# Patient Record
Sex: FEMALE | Race: WHITE | NOT HISPANIC OR LATINO | Employment: FULL TIME | URBAN - METROPOLITAN AREA
[De-identification: names, ages, dates, MRNs, and addresses within clinical notes are randomized per-mention and may not be internally consistent; named-entity substitution may affect disease eponyms.]

---

## 2023-02-24 LAB
ALANINE AMINOTRANSFERASE (SGPT) (U/L) IN SER/PLAS: 17 U/L (ref 7–45)
ALBUMIN (G/DL) IN SER/PLAS: 4.3 G/DL (ref 3.4–5)
ALKALINE PHOSPHATASE (U/L) IN SER/PLAS: 43 U/L (ref 33–110)
ANION GAP IN SER/PLAS: 9 MMOL/L (ref 10–20)
ASPARTATE AMINOTRANSFERASE (SGOT) (U/L) IN SER/PLAS: 18 U/L (ref 9–39)
BASOPHILS (10*3/UL) IN BLOOD BY AUTOMATED COUNT: 0.08 X10E9/L (ref 0–0.1)
BASOPHILS/100 LEUKOCYTES IN BLOOD BY AUTOMATED COUNT: 1.6 % (ref 0–2)
BILIRUBIN TOTAL (MG/DL) IN SER/PLAS: 0.6 MG/DL (ref 0–1.2)
CALCIUM (MG/DL) IN SER/PLAS: 10 MG/DL (ref 8.6–10.3)
CARBON DIOXIDE, TOTAL (MMOL/L) IN SER/PLAS: 28 MMOL/L (ref 21–32)
CHLORIDE (MMOL/L) IN SER/PLAS: 103 MMOL/L (ref 98–107)
CREATININE (MG/DL) IN SER/PLAS: 0.86 MG/DL (ref 0.5–1.05)
EOSINOPHILS (10*3/UL) IN BLOOD BY AUTOMATED COUNT: 0.2 X10E9/L (ref 0–0.7)
EOSINOPHILS/100 LEUKOCYTES IN BLOOD BY AUTOMATED COUNT: 4 % (ref 0–6)
ERYTHROCYTE DISTRIBUTION WIDTH (RATIO) BY AUTOMATED COUNT: 12.9 % (ref 11.5–14.5)
ERYTHROCYTE MEAN CORPUSCULAR HEMOGLOBIN CONCENTRATION (G/DL) BY AUTOMATED: 33.5 G/DL (ref 32–36)
ERYTHROCYTE MEAN CORPUSCULAR VOLUME (FL) BY AUTOMATED COUNT: 97 FL (ref 80–100)
ERYTHROCYTES (10*6/UL) IN BLOOD BY AUTOMATED COUNT: 4.23 X10E12/L (ref 4–5.2)
GFR FEMALE: 88 ML/MIN/1.73M2
GLUCOSE (MG/DL) IN SER/PLAS: 90 MG/DL (ref 74–99)
HEMATOCRIT (%) IN BLOOD BY AUTOMATED COUNT: 41.2 % (ref 36–46)
HEMOGLOBIN (G/DL) IN BLOOD: 13.8 G/DL (ref 12–16)
IMMATURE GRANULOCYTES/100 LEUKOCYTES IN BLOOD BY AUTOMATED COUNT: 0.2 % (ref 0–0.9)
LEUKOCYTES (10*3/UL) IN BLOOD BY AUTOMATED COUNT: 5 X10E9/L (ref 4.4–11.3)
LYMPHOCYTES (10*3/UL) IN BLOOD BY AUTOMATED COUNT: 1.57 X10E9/L (ref 1.2–4.8)
LYMPHOCYTES/100 LEUKOCYTES IN BLOOD BY AUTOMATED COUNT: 31.5 % (ref 13–44)
MONOCYTES (10*3/UL) IN BLOOD BY AUTOMATED COUNT: 0.38 X10E9/L (ref 0.1–1)
MONOCYTES/100 LEUKOCYTES IN BLOOD BY AUTOMATED COUNT: 7.6 % (ref 2–10)
NEUTROPHILS (10*3/UL) IN BLOOD BY AUTOMATED COUNT: 2.75 X10E9/L (ref 1.2–7.7)
NEUTROPHILS/100 LEUKOCYTES IN BLOOD BY AUTOMATED COUNT: 55.1 % (ref 40–80)
PLATELETS (10*3/UL) IN BLOOD AUTOMATED COUNT: 273 X10E9/L (ref 150–450)
POTASSIUM (MMOL/L) IN SER/PLAS: 4.4 MMOL/L (ref 3.5–5.3)
PROTEIN TOTAL: 7.6 G/DL (ref 6.4–8.2)
SODIUM (MMOL/L) IN SER/PLAS: 136 MMOL/L (ref 136–145)
THYROXINE (T4) FREE (NG/DL) IN SER/PLAS: 0.94 NG/DL (ref 0.61–1.12)
UREA NITROGEN (MG/DL) IN SER/PLAS: 13 MG/DL (ref 6–23)

## 2023-02-25 LAB
THYROPEROXIDASE AB (IU/ML) IN SER/PLAS: >1000 IU/ML
TRIIODOTHYRONINE (T3) FREE (PG/ML) IN SER/PLAS: 3.8 PG/ML (ref 2.3–4.2)

## 2023-10-07 ASSESSMENT — PROMIS GLOBAL HEALTH SCALE
RATE_AVERAGE_FATIGUE: MILD
RATE_MENTAL_HEALTH: EXCELLENT
CARRYOUT_PHYSICAL_ACTIVITIES: COMPLETELY
RATE_QUALITY_OF_LIFE: EXCELLENT
RATE_AVERAGE_PAIN: 0
RATE_PHYSICAL_HEALTH: EXCELLENT
CARRYOUT_SOCIAL_ACTIVITIES: EXCELLENT
RATE_GENERAL_HEALTH: EXCELLENT
RATE_SOCIAL_SATISFACTION: EXCELLENT
EMOTIONAL_PROBLEMS: RARELY

## 2023-10-11 ENCOUNTER — OFFICE VISIT (OUTPATIENT)
Dept: PRIMARY CARE | Facility: CLINIC | Age: 38
End: 2023-10-11
Payer: COMMERCIAL

## 2023-10-11 DIAGNOSIS — E04.1 THYROID NODULE: ICD-10-CM

## 2023-10-11 DIAGNOSIS — Z23 NEED FOR INFLUENZA VACCINATION: Primary | ICD-10-CM

## 2023-10-11 DIAGNOSIS — Z00.00 ANNUAL PHYSICAL EXAM: ICD-10-CM

## 2023-10-11 PROCEDURE — 99396 PREV VISIT EST AGE 40-64: CPT | Performed by: NURSE PRACTITIONER

## 2023-10-11 PROCEDURE — 90686 IIV4 VACC NO PRSV 0.5 ML IM: CPT | Performed by: NURSE PRACTITIONER

## 2023-10-11 PROCEDURE — 90471 IMMUNIZATION ADMIN: CPT | Performed by: NURSE PRACTITIONER

## 2023-10-11 NOTE — PROGRESS NOTES
Subjective   Patient ID: Keisha Whatley is a 38 y.o. female who presents for Annual Exam.  Elevated  glucose  on biometric screening  No FH diabetic  A1c today 5.1  FH  PGF   MI older   Parents well  Eye exam     needs  Dentist UTD  Gyn  - [facility] - UTD  No other meds  NutraFol   3 /11  Mammogram done  Flu today  Melatonin PRN  Tdap   to   check    date                  Review of Systems   Constitutional: Negative.    HENT:  Negative for sore throat and voice change.    Respiratory: Negative.     Cardiovascular: Negative.    Endocrine: Negative.    Allergic/Immunologic: Negative.    Neurological: Negative.              Objective   Physical Exam  Vitals reviewed.   Constitutional:       Appearance: Normal appearance.   Eyes:      Extraocular Movements: Extraocular movements intact.      Conjunctiva/sclera: Conjunctivae normal.      Pupils: Pupils are equal, round, and reactive to light.   Neck:      Comments: Nodule unchanged  Cardiovascular:      Rate and Rhythm: Normal rate and regular rhythm.   Pulmonary:      Effort: Pulmonary effort is normal.      Breath sounds: Normal breath sounds.   Musculoskeletal:      Cervical back: No rigidity.   Lymphadenopathy:      Cervical: No cervical adenopathy.   Skin:     Capillary Refill: Capillary refill takes less than 2 seconds.   Neurological:      General: No focal deficit present.      Mental Status: She is alert.   Psychiatric:         Mood and Affect: Mood normal.         Assessment/Plan   Diagnoses and all orders for this visit:  Need for influenza vaccination  -     Flu vaccine (IIV4) age 6 months and greater, preservative free  Annual physical exam  -     Lipid Panel; Future  -     Comprehensive Metabolic Panel; Future  -     CBC and Auto Differential; Future  Thyroid nodule  -     TSH with reflex to Free T4 if abnormal; Future  -     Triiodothyronine, Free; Future  -     Thyroid Peroxidase (TPO) Antibody; Future

## 2023-10-24 ENCOUNTER — LAB (OUTPATIENT)
Dept: LAB | Facility: LAB | Age: 38
End: 2023-10-24
Payer: COMMERCIAL

## 2023-10-24 DIAGNOSIS — E04.1 THYROID NODULE: ICD-10-CM

## 2023-10-24 DIAGNOSIS — Z00.00 ANNUAL PHYSICAL EXAM: ICD-10-CM

## 2023-10-24 LAB
ALBUMIN SERPL BCP-MCNC: 4.3 G/DL (ref 3.4–5)
ALP SERPL-CCNC: 36 U/L (ref 33–110)
ALT SERPL W P-5'-P-CCNC: 15 U/L (ref 7–45)
ANION GAP SERPL CALC-SCNC: 10 MMOL/L (ref 10–20)
AST SERPL W P-5'-P-CCNC: 23 U/L (ref 9–39)
BASOPHILS # BLD AUTO: 0.09 X10*3/UL (ref 0–0.1)
BASOPHILS NFR BLD AUTO: 1.2 %
BILIRUB SERPL-MCNC: 0.4 MG/DL (ref 0–1.2)
BUN SERPL-MCNC: 17 MG/DL (ref 6–23)
CALCIUM SERPL-MCNC: 10.4 MG/DL (ref 8.6–10.3)
CHLORIDE SERPL-SCNC: 102 MMOL/L (ref 98–107)
CHOLEST SERPL-MCNC: 162 MG/DL (ref 0–199)
CHOLESTEROL/HDL RATIO: 2.7
CO2 SERPL-SCNC: 27 MMOL/L (ref 21–32)
CREAT SERPL-MCNC: 0.97 MG/DL (ref 0.5–1.05)
EOSINOPHIL # BLD AUTO: 0.03 X10*3/UL (ref 0–0.7)
EOSINOPHIL NFR BLD AUTO: 0.4 %
ERYTHROCYTE [DISTWIDTH] IN BLOOD BY AUTOMATED COUNT: 13.1 % (ref 11.5–14.5)
GFR SERPL CREATININE-BSD FRML MDRD: 76 ML/MIN/1.73M*2
GLUCOSE SERPL-MCNC: 99 MG/DL (ref 74–99)
HCT VFR BLD AUTO: 41 % (ref 36–46)
HDLC SERPL-MCNC: 59.4 MG/DL
HGB BLD-MCNC: 13.7 G/DL (ref 12–16)
IMM GRANULOCYTES # BLD AUTO: 0.01 X10*3/UL (ref 0–0.7)
IMM GRANULOCYTES NFR BLD AUTO: 0.1 % (ref 0–0.9)
LDLC SERPL CALC-MCNC: 89 MG/DL
LYMPHOCYTES # BLD AUTO: 2.04 X10*3/UL (ref 1.2–4.8)
LYMPHOCYTES NFR BLD AUTO: 26.7 %
MCH RBC QN AUTO: 32.5 PG (ref 26–34)
MCHC RBC AUTO-ENTMCNC: 33.4 G/DL (ref 32–36)
MCV RBC AUTO: 97 FL (ref 80–100)
MONOCYTES # BLD AUTO: 0.5 X10*3/UL (ref 0.1–1)
MONOCYTES NFR BLD AUTO: 6.5 %
NEUTROPHILS # BLD AUTO: 4.97 X10*3/UL (ref 1.2–7.7)
NEUTROPHILS NFR BLD AUTO: 65.1 %
NON HDL CHOLESTEROL: 103 MG/DL (ref 0–149)
NRBC BLD-RTO: 0 /100 WBCS (ref 0–0)
PLATELET # BLD AUTO: 298 X10*3/UL (ref 150–450)
PMV BLD AUTO: 9.9 FL (ref 7.5–11.5)
POTASSIUM SERPL-SCNC: 3.8 MMOL/L (ref 3.5–5.3)
PROT SERPL-MCNC: 7.8 G/DL (ref 6.4–8.2)
RBC # BLD AUTO: 4.21 X10*6/UL (ref 4–5.2)
SODIUM SERPL-SCNC: 135 MMOL/L (ref 136–145)
TRIGL SERPL-MCNC: 70 MG/DL (ref 0–149)
TSH SERPL-ACNC: 1.16 MIU/L (ref 0.44–3.98)
VLDL: 14 MG/DL (ref 0–40)
WBC # BLD AUTO: 7.6 X10*3/UL (ref 4.4–11.3)

## 2023-10-24 PROCEDURE — 80061 LIPID PANEL: CPT

## 2023-10-24 PROCEDURE — 86376 MICROSOMAL ANTIBODY EACH: CPT

## 2023-10-24 PROCEDURE — 36415 COLL VENOUS BLD VENIPUNCTURE: CPT

## 2023-10-24 PROCEDURE — 84481 FREE ASSAY (FT-3): CPT

## 2023-10-24 PROCEDURE — 84443 ASSAY THYROID STIM HORMONE: CPT

## 2023-10-24 PROCEDURE — 85025 COMPLETE CBC W/AUTO DIFF WBC: CPT

## 2023-10-24 PROCEDURE — 80053 COMPREHEN METABOLIC PANEL: CPT

## 2023-10-25 LAB
T3FREE SERPL-MCNC: 3.3 PG/ML (ref 2.3–4.2)
THYROPEROXIDASE AB SERPL-ACNC: >1000 IU/ML

## 2023-10-31 PROBLEM — E04.1 THYROID NODULE: Status: ACTIVE | Noted: 2023-10-31

## 2023-10-31 PROBLEM — Z23 NEED FOR INFLUENZA VACCINATION: Status: ACTIVE | Noted: 2023-10-31

## 2023-10-31 PROBLEM — Z00.00 ANNUAL PHYSICAL EXAM: Status: ACTIVE | Noted: 2023-10-31

## 2023-10-31 ASSESSMENT — ENCOUNTER SYMPTOMS
VOICE CHANGE: 0
CONSTITUTIONAL NEGATIVE: 1
RESPIRATORY NEGATIVE: 1
ENDOCRINE NEGATIVE: 1
CARDIOVASCULAR NEGATIVE: 1
ALLERGIC/IMMUNOLOGIC NEGATIVE: 1
SORE THROAT: 0
NEUROLOGICAL NEGATIVE: 1

## 2023-11-29 DIAGNOSIS — E04.1 THYROID NODULE: Primary | ICD-10-CM

## 2023-12-18 DIAGNOSIS — E04.1 THYROID NODULE: Primary | ICD-10-CM

## 2023-12-22 ENCOUNTER — TELEPHONE (OUTPATIENT)
Dept: OBSTETRICS AND GYNECOLOGY | Facility: CLINIC | Age: 38
End: 2023-12-22

## 2023-12-22 DIAGNOSIS — Z30.9 ENCOUNTER FOR CONTRACEPTIVE MANAGEMENT, UNSPECIFIED TYPE: Primary | ICD-10-CM

## 2023-12-22 DIAGNOSIS — Z30.09 BIRTH CONTROL COUNSELING: ICD-10-CM

## 2023-12-22 RX ORDER — NORGESTREL AND ETHINYL ESTRADIOL 0.3-0.03MG
1 KIT ORAL DAILY
Qty: 28 TABLET | Refills: 2 | Status: SHIPPED | OUTPATIENT
Start: 2023-12-22 | End: 2024-02-09 | Stop reason: SDUPTHER

## 2023-12-22 RX ORDER — NORGESTREL AND ETHINYL ESTRADIOL 0.3-0.03MG
1 KIT ORAL DAILY
COMMUNITY
End: 2023-12-22 | Stop reason: SDUPTHER

## 2023-12-22 NOTE — TELEPHONE ENCOUNTER
Pt made annual appt with Juliana for 2/9. She is requesting a refill of her b/c to get her to that appt. She last saw Julito cortes, and she did not state name of medication, but pharmacy has been verified.

## 2024-01-22 ENCOUNTER — TELEPHONE (OUTPATIENT)
Dept: OBSTETRICS AND GYNECOLOGY | Facility: CLINIC | Age: 39
End: 2024-01-22
Payer: COMMERCIAL

## 2024-01-23 NOTE — TELEPHONE ENCOUNTER
Called pharmacy to verify refills on the prescription that was sent in on 12/22/23. Man at the pharmacy confirmed prescription was there and not filled.   I followed up with Pt, I advised her to refill medication when she was ready

## 2024-01-24 ENCOUNTER — E-CONSULT (OUTPATIENT)
Dept: ENDOCRINOLOGY | Facility: HOSPITAL | Age: 39
End: 2024-01-24
Payer: COMMERCIAL

## 2024-01-24 NOTE — PROGRESS NOTES
I am sorry for delayed answer    This patient will benefit biopsy of the calcified nodule.  Her hashimoto's only needs follow up blood tests yearly      Dr. Carson Chahal of Medicine,[facility]  Director, [facility]  , system operations

## 2024-02-06 RX ORDER — NORGESTREL AND ETHINYL ESTRADIOL 0.3-0.03MG
1 KIT ORAL DAILY
Qty: 28 TABLET | Refills: 3 | OUTPATIENT
Start: 2024-02-06

## 2024-02-09 ENCOUNTER — OFFICE VISIT (OUTPATIENT)
Dept: OBSTETRICS AND GYNECOLOGY | Facility: CLINIC | Age: 39
End: 2024-02-09
Payer: COMMERCIAL

## 2024-02-09 VITALS
WEIGHT: 138 LBS | HEIGHT: 63 IN | BODY MASS INDEX: 24.45 KG/M2 | DIASTOLIC BLOOD PRESSURE: 64 MMHG | SYSTOLIC BLOOD PRESSURE: 104 MMHG

## 2024-02-09 DIAGNOSIS — Z01.419 WELL WOMAN EXAM: Primary | ICD-10-CM

## 2024-02-09 DIAGNOSIS — Z30.9 ENCOUNTER FOR CONTRACEPTIVE MANAGEMENT, UNSPECIFIED TYPE: ICD-10-CM

## 2024-02-09 PROCEDURE — 99396 PREV VISIT EST AGE 40-64: CPT

## 2024-02-09 PROCEDURE — 1036F TOBACCO NON-USER: CPT

## 2024-02-09 RX ORDER — NORGESTREL AND ETHINYL ESTRADIOL 0.3-0.03MG
1 KIT ORAL DAILY
Qty: 84 TABLET | Refills: 3 | Status: SHIPPED | OUTPATIENT
Start: 2024-02-09 | End: 2024-04-16 | Stop reason: SDUPTHER

## 2024-02-09 NOTE — PROGRESS NOTES
Subjective   Keisha Whatley is a 38 y.o. female who is here for a routine exam. No vaginal concerns at this time including abnormal discharge, odor or discomfort. She is sexually active with one male partner and has no concern for STI exposure. She has no pain or bleeding with sex. She denies bladder or bowel concerns.      Current contraception: OCP (estrogen/progesterone)  LMP: 1/24/24  Last pap: 2/8/23  History of abnormal Pap smear: no  Due for pap: no  Family history of uterine or ovarian cancer: no  Family history of prostate cancer: no  Family history of pancreatic cancer: no  Family hx of BRCA1/BRCA2: no  Family history of breast cancer: no  Last mammogram: June 2023, cat 1  Gardasil: No      OB History    No obstetric history on file.         Review of Systems   All other systems reviewed and are negative.      Objective   There were no vitals taken for this visit.    Physical Exam  HENT:      Mouth/Throat:      Mouth: Mucous membranes are moist.   Eyes:      Conjunctiva/sclera: Conjunctivae normal.   Neck:      Thyroid: No thyroid mass, thyromegaly or thyroid tenderness.   Cardiovascular:      Rate and Rhythm: Normal rate and regular rhythm.      Pulses: Normal pulses.   Pulmonary:      Effort: Pulmonary effort is normal.      Breath sounds: Normal breath sounds.   Chest:   Breasts:     Breasts are symmetrical.      Right: Normal.      Left: Normal.   Abdominal:      General: Abdomen is flat.      Palpations: Abdomen is soft.      Hernia: There is no hernia in the left inguinal area or right inguinal area.   Genitourinary:     General: Normal vulva.      Uterus: Normal.       Adnexa: Right adnexa normal and left adnexa normal.   Musculoskeletal:         General: Normal range of motion.      Cervical back: Normal range of motion.   Lymphadenopathy:      Cervical: No cervical adenopathy.      Right cervical: No superficial, deep or posterior cervical adenopathy.     Left cervical: No superficial, deep or  posterior cervical adenopathy.      Lower Body: No right inguinal adenopathy. No left inguinal adenopathy.   Skin:     General: Skin is warm.      Capillary Refill: Capillary refill takes less than 2 seconds.   Neurological:      Mental Status: She is alert and oriented to person, place, and time.   Psychiatric:         Mood and Affect: Mood normal.         Behavior: Behavior normal.          Assessment/Plan   A&P --> 38 y.o. y.o woman for annual GYN exam.     - Health Maintenance -->  - Routine follow up with PCP for health maintenance examination encouraged, including TSH, cholesterol, and Vit. D evaluation.  Self breast awareness encouraged; concerning characteristics of breasts reviewed - Pt. will report general concerns, any adherent lumps, skin dimpling/puckering or color changes, and any nipple discharge. Mammogram order provided.    Diet and exercise reviewed.     Pap, next due 2028:- Reviewed ACOG and ASCCP guidelines with pt.        - STD Screening: declines     - Contraception Plan:  Elinest . Denies any new contraindications to use and refills sent to pharmacy     - F/U 1 year or as needed.

## 2024-03-29 DIAGNOSIS — E04.1 THYROID NODULE: Primary | ICD-10-CM

## 2024-04-03 ENCOUNTER — APPOINTMENT (OUTPATIENT)
Dept: RADIOLOGY | Facility: CLINIC | Age: 39
End: 2024-04-03
Payer: COMMERCIAL

## 2024-04-16 DIAGNOSIS — Z30.9 ENCOUNTER FOR CONTRACEPTIVE MANAGEMENT, UNSPECIFIED TYPE: ICD-10-CM

## 2024-04-16 RX ORDER — NORGESTREL AND ETHINYL ESTRADIOL 0.3-0.03MG
1 KIT ORAL DAILY
Qty: 84 TABLET | Refills: 3 | Status: SHIPPED | OUTPATIENT
Start: 2024-04-16

## 2024-04-16 RX ORDER — NORGESTREL AND ETHINYL ESTRADIOL 0.3-0.03MG
1 KIT ORAL DAILY
Qty: 84 TABLET | Refills: 0 | Status: SHIPPED | OUTPATIENT
Start: 2024-04-16 | End: 2024-04-16 | Stop reason: SDUPTHER

## 2024-06-18 ENCOUNTER — APPOINTMENT (OUTPATIENT)
Dept: ENDOCRINOLOGY | Facility: CLINIC | Age: 39
End: 2024-06-18
Payer: COMMERCIAL

## 2024-06-18 VITALS
SYSTOLIC BLOOD PRESSURE: 124 MMHG | RESPIRATION RATE: 16 BRPM | HEIGHT: 63 IN | WEIGHT: 134.2 LBS | BODY MASS INDEX: 23.78 KG/M2 | DIASTOLIC BLOOD PRESSURE: 70 MMHG | HEART RATE: 76 BPM

## 2024-06-18 DIAGNOSIS — E06.3 HASHIMOTO'S THYROIDITIS: ICD-10-CM

## 2024-06-18 DIAGNOSIS — E04.2 MULTIPLE THYROID NODULES: Primary | ICD-10-CM

## 2024-06-18 PROCEDURE — 99204 OFFICE O/P NEW MOD 45 MIN: CPT | Performed by: INTERNAL MEDICINE

## 2024-06-18 PROCEDURE — 1036F TOBACCO NON-USER: CPT | Performed by: INTERNAL MEDICINE

## 2024-06-18 ASSESSMENT — ENCOUNTER SYMPTOMS
NAUSEA: 0
FATIGUE: 0
HEADACHES: 0
FEVER: 0
DIARRHEA: 0
SHORTNESS OF BREATH: 0
CHILLS: 0
PALPITATIONS: 0
VOMITING: 0
COUGH: 0

## 2024-06-18 NOTE — LETTER
June 18, 2024       No Recipients    Patient: Keisha Whatley   YOB: 1985   Date of Visit: 6/18/2024       Dear Dr. Santillan Recipients:    Thank you for referring Keisha Whatley to me for evaluation. Below are my notes for this consultation.  If you have questions, please do not hesitate to call me. I look forward to following your patient along with you.       Sincerely,     Sabina Davies MD      CC:   No Recipients  ______________________________________________________________________________________    Endocrinology New Patient Consult  Subjective  Patient ID: Keisha Whatley is a 39 y.o. female who presents for abnormal thyroid levels and goiter. Patient was referred by CLARICE Garner.    PCP: CLARICE Garner    HPI  39-year-old referred by TY Moody CNP for evaluation of Hashimoto's also with thyroid nodules.  She had routine blood work in October that was positive for Hashimoto's antibodies.  Of note she also had Hashimoto's antibodies 1 year prior.  Her TSH in October 2023 was normal at 1.1.  In 2023 she also had a neck ultrasound due to lymph nodes that did show some cervical lymph nodes but also a low suspicion right 2.8 cm thyroid nodule and moderate suspicion small nodule 1.4 cm.  She has not had a thyroid ultrasound since.  She denies any personal or family history of thyroid disorders.  She has been feeling very well and denies any complaints.  Her energy level is normal.  She sleeps well.  She has regular menstrual cycles.  She has lost a few pounds intentionally.  She does tend to run warm but this has how she has been all her life.  She has no history of radiation to the head or neck    Review of Systems   Constitutional:  Negative for chills, fatigue and fever.   Respiratory:  Negative for cough and shortness of breath.    Cardiovascular:  Negative for chest pain and palpitations.   Gastrointestinal:  Negative for diarrhea, nausea and vomiting.    Neurological:  Negative for headaches.       Patient Active Problem List   Diagnosis   • Annual physical exam   • Need for influenza vaccination   • Thyroid nodule       Past Medical History:   Diagnosis Date   • Allergy, unspecified, initial encounter 12/10/2013    Allergic reaction   • Personal history of other diseases of the respiratory system     History of allergic rhinitis        History reviewed. No pertinent surgical history.     Social History     Tobacco Use   Smoking Status Former   • Types: Cigarettes   Smokeless Tobacco Former      Social History     Substance and Sexual Activity   Alcohol Use None    Comment: Socially      Marital status:   Employment: Marketing    No family history on file.     Home Meds:  Current Outpatient Medications   Medication Instructions   • Elinest 0.3-30 mg-mcg tablet 1 tablet, oral, Daily, Take 1 tablet daily for 21 days, then 7 tablet-free days, repeat.   • multivit-min/iron/folic/[id number] (HAIR, SKIN AND NAILS ADVANCED ORAL) oral, Nutrafol        No Known Allergies     Objective  Vitals:    06/18/24 1118   BP: 124/70   Pulse: 76   Resp: 16      Vitals:    06/18/24 1118   Weight: 60.9 kg (134 lb 3.2 oz)      Body mass index is 23.77 kg/m².   Physical Exam  Constitutional:       Appearance: Normal appearance. She is normal weight.   HENT:      Head: Normocephalic and atraumatic.   Neck:      Thyroid: No thyroid mass, thyromegaly or thyroid tenderness.      Comments: Mild thyroid enlargement right > left  Cardiovascular:      Rate and Rhythm: Normal rate and regular rhythm.      Heart sounds: No murmur heard.     No gallop.   Pulmonary:      Effort: Pulmonary effort is normal.      Breath sounds: Normal breath sounds.   Abdominal:      Palpations: Abdomen is soft.      Comments: benign   Neurological:      General: No focal deficit present.      Mental Status: She is alert and oriented to person, place, and time.      Deep Tendon Reflexes: Reflexes are normal and  symmetric.   Psychiatric:         Behavior: Behavior is cooperative.         Labs:  Lab Results   Component Value Date    TSH 1.16 10/24/2023    FREET4 0.94 02/24/2023      Lab Results   Component Value Date    THYROIDPAB >1,000 (H) 10/24/2023        Assessment/Plan  Problem List Items Addressed This Visit    None  Visit Diagnoses       Multiple thyroid nodules    -  Primary    Relevant Orders    US thyroid    Hashimoto's thyroiditis            39-year-old here for evaluation of Hashimoto's and multiple thyroid nodules.  We discussed her course.  We reviewed her thyroid blood work which showed normal function.  We discussed Hashimoto's as a risk factor for future hypothyroidism however her most recent thyroid blood work was completely normal.  I would recommend routine yearly screening.  We did discuss her thyroid ultrasound from a year and a half ago that did show thyroid nodules but given it was a neck ultrasound for lymph nodes and did not have TI-RADS grading system or exact details of the nodules.  I would recommend a repeat thyroid ultrasound.  Asked plans based on ultrasound appearance.  Follow-up plans will be based on repeat ultrasound results as well.  I encouraged her to call or message with any concerns or questions    Electronically signed by:  Sabina Davies MD 06/18/24  12:44 PM

## 2024-06-18 NOTE — LETTER
June 18, 2024     CLARICE Garner  [address]  [address]  [place] [address]    Patient: Keisha Whatley   YOB: 1985   Date of Visit: 6/18/2024       Dear CLARICE Pope:    Thank you for referring Keisha Whatley to me for evaluation. Below are my notes for this consultation.  If you have questions, please do not hesitate to call me. I look forward to following your patient along with you.       Sincerely,     Sabina Davies MD      CC: No Recipients  ______________________________________________________________________________________    Endocrinology New Patient Consult  Subjective  Patient ID: Keisha Whatley is a 39 y.o. female who presents for abnormal thyroid levels and goiter. Patient was referred by CLARICE Garner.    PCP: CLARICE Garner    HPI  39-year-old referred by TY Moody CNP for evaluation of Hashimoto's also with thyroid nodules.  She had routine blood work in October that was positive for Hashimoto's antibodies.  Of note she also had Hashimoto's antibodies 1 year prior.  Her TSH in October 2023 was normal at 1.1.  In 2023 she also had a neck ultrasound due to lymph nodes that did show some cervical lymph nodes but also a low suspicion right 2.8 cm thyroid nodule and moderate suspicion small nodule 1.4 cm.  She has not had a thyroid ultrasound since.  She denies any personal or family history of thyroid disorders.  She has been feeling very well and denies any complaints.  Her energy level is normal.  She sleeps well.  She has regular menstrual cycles.  She has lost a few pounds intentionally.  She does tend to run warm but this has how she has been all her life.  She has no history of radiation to the head or neck    Review of Systems   Constitutional:  Negative for chills, fatigue and fever.   Respiratory:  Negative for cough and shortness of breath.    Cardiovascular:  Negative for chest pain and palpitations.    Gastrointestinal:  Negative for diarrhea, nausea and vomiting.   Neurological:  Negative for headaches.       Patient Active Problem List   Diagnosis   • Annual physical exam   • Need for influenza vaccination   • Thyroid nodule       Past Medical History:   Diagnosis Date   • Allergy, unspecified, initial encounter 12/10/2013    Allergic reaction   • Personal history of other diseases of the respiratory system     History of allergic rhinitis        History reviewed. No pertinent surgical history.     Social History     Tobacco Use   Smoking Status Former   • Types: Cigarettes   Smokeless Tobacco Former      Social History     Substance and Sexual Activity   Alcohol Use None    Comment: Socially      Marital status:   Employment: Marketing    No family history on file.     Home Meds:  Current Outpatient Medications   Medication Instructions   • Elinest 0.3-30 mg-mcg tablet 1 tablet, oral, Daily, Take 1 tablet daily for 21 days, then 7 tablet-free days, repeat.   • multivit-min/iron/folic/[id number] (HAIR, SKIN AND NAILS ADVANCED ORAL) oral, Nutrafol        No Known Allergies     Objective  Vitals:    06/18/24 1118   BP: 124/70   Pulse: 76   Resp: 16      Vitals:    06/18/24 1118   Weight: 60.9 kg (134 lb 3.2 oz)      Body mass index is 23.77 kg/m².   Physical Exam  Constitutional:       Appearance: Normal appearance. She is normal weight.   HENT:      Head: Normocephalic and atraumatic.   Neck:      Thyroid: No thyroid mass, thyromegaly or thyroid tenderness.      Comments: Mild thyroid enlargement right > left  Cardiovascular:      Rate and Rhythm: Normal rate and regular rhythm.      Heart sounds: No murmur heard.     No gallop.   Pulmonary:      Effort: Pulmonary effort is normal.      Breath sounds: Normal breath sounds.   Abdominal:      Palpations: Abdomen is soft.      Comments: benign   Neurological:      General: No focal deficit present.      Mental Status: She is alert and oriented to person, place,  and time.      Deep Tendon Reflexes: Reflexes are normal and symmetric.   Psychiatric:         Behavior: Behavior is cooperative.         Labs:  Lab Results   Component Value Date    TSH 1.16 10/24/2023    FREET4 0.94 02/24/2023      Lab Results   Component Value Date    THYROIDPAB >1,000 (H) 10/24/2023        Assessment/Plan  Problem List Items Addressed This Visit    None  Visit Diagnoses       Multiple thyroid nodules    -  Primary    Relevant Orders    US thyroid    Hashimoto's thyroiditis            39-year-old here for evaluation of Hashimoto's and multiple thyroid nodules.  We discussed her course.  We reviewed her thyroid blood work which showed normal function.  We discussed Hashimoto's as a risk factor for future hypothyroidism however her most recent thyroid blood work was completely normal.  I would recommend routine yearly screening.  We did discuss her thyroid ultrasound from a year and a half ago that did show thyroid nodules but given it was a neck ultrasound for lymph nodes and did not have TI-RADS grading system or exact details of the nodules.  I would recommend a repeat thyroid ultrasound.  Asked plans based on ultrasound appearance.  Follow-up plans will be based on repeat ultrasound results as well.  I encouraged her to call or message with any concerns or questions    Electronically signed by:  Sabina Davies MD 06/18/24  12:44 PM

## 2024-06-18 NOTE — PROGRESS NOTES
Endocrinology New Patient Consult  Subjective   Patient ID: Keisha Whatley is a 39 y.o. female who presents for abnormal thyroid levels and goiter. Patient was referred by CLARICE Garner.    PCP: CLARICE Garner    HPI  39-year-old referred by TY Moody CNP for evaluation of Hashimoto's also with thyroid nodules.  She had routine blood work in October that was positive for Hashimoto's antibodies.  Of note she also had Hashimoto's antibodies 1 year prior.  Her TSH in October 2023 was normal at 1.1.  In 2023 she also had a neck ultrasound due to lymph nodes that did show some cervical lymph nodes but also a low suspicion right 2.8 cm thyroid nodule and moderate suspicion small nodule 1.4 cm.  She has not had a thyroid ultrasound since.  She denies any personal or family history of thyroid disorders.  She has been feeling very well and denies any complaints.  Her energy level is normal.  She sleeps well.  She has regular menstrual cycles.  She has lost a few pounds intentionally.  She does tend to run warm but this has how she has been all her life.  She has no history of radiation to the head or neck    Review of Systems   Constitutional:  Negative for chills, fatigue and fever.   Respiratory:  Negative for cough and shortness of breath.    Cardiovascular:  Negative for chest pain and palpitations.   Gastrointestinal:  Negative for diarrhea, nausea and vomiting.   Neurological:  Negative for headaches.       Patient Active Problem List   Diagnosis    Annual physical exam    Need for influenza vaccination    Thyroid nodule       Past Medical History:   Diagnosis Date    Allergy, unspecified, initial encounter 12/10/2013    Allergic reaction    Personal history of other diseases of the respiratory system     History of allergic rhinitis        History reviewed. No pertinent surgical history.     Social History     Tobacco Use   Smoking Status Former    Types: Cigarettes   Smokeless Tobacco  Former      Social History     Substance and Sexual Activity   Alcohol Use None    Comment: Socially      Marital status:   Employment: Marketing    No family history on file.     Home Meds:  Current Outpatient Medications   Medication Instructions    Elinest 0.3-30 mg-mcg tablet 1 tablet, oral, Daily, Take 1 tablet daily for 21 days, then 7 tablet-free days, repeat.    multivit-min/iron/folic/[id number] (HAIR, SKIN AND NAILS ADVANCED ORAL) oral, Nutrafol        No Known Allergies     Objective   Vitals:    06/18/24 1118   BP: 124/70   Pulse: 76   Resp: 16      Vitals:    06/18/24 1118   Weight: 60.9 kg (134 lb 3.2 oz)      Body mass index is 23.77 kg/m².   Physical Exam  Constitutional:       Appearance: Normal appearance. She is normal weight.   HENT:      Head: Normocephalic and atraumatic.   Neck:      Thyroid: No thyroid mass, thyromegaly or thyroid tenderness.      Comments: Mild thyroid enlargement right > left  Cardiovascular:      Rate and Rhythm: Normal rate and regular rhythm.      Heart sounds: No murmur heard.     No gallop.   Pulmonary:      Effort: Pulmonary effort is normal.      Breath sounds: Normal breath sounds.   Abdominal:      Palpations: Abdomen is soft.      Comments: benign   Neurological:      General: No focal deficit present.      Mental Status: She is alert and oriented to person, place, and time.      Deep Tendon Reflexes: Reflexes are normal and symmetric.   Psychiatric:         Behavior: Behavior is cooperative.         Labs:  Lab Results   Component Value Date    TSH 1.16 10/24/2023    FREET4 0.94 02/24/2023      Lab Results   Component Value Date    THYROIDPAB >1,000 (H) 10/24/2023        Assessment/Plan   Problem List Items Addressed This Visit    None  Visit Diagnoses       Multiple thyroid nodules    -  Primary    Relevant Orders    US thyroid    Hashimoto's thyroiditis            39-year-old here for evaluation of Hashimoto's and multiple thyroid nodules.  We discussed her  course.  We reviewed her thyroid blood work which showed normal function.  We discussed Hashimoto's as a risk factor for future hypothyroidism however her most recent thyroid blood work was completely normal.  I would recommend routine yearly screening.  We did discuss her thyroid ultrasound from a year and a half ago that did show thyroid nodules but given it was a neck ultrasound for lymph nodes and did not have TI-RADS grading system or exact details of the nodules.  I would recommend a repeat thyroid ultrasound.  Asked plans based on ultrasound appearance.  Follow-up plans will be based on repeat ultrasound results as well.  I encouraged her to call or message with any concerns or questions    Electronically signed by:  Sabina Davies MD 06/18/24  12:44 PM

## 2024-06-18 NOTE — PATIENT INSTRUCTIONS
Please schedule thyroid ultrasound as discussed  Further plans based on results  Yearly thyroid blood work should be done with your physical  Please call or message with any concerns or questions

## 2024-06-26 ENCOUNTER — HOSPITAL ENCOUNTER (OUTPATIENT)
Dept: RADIOLOGY | Facility: CLINIC | Age: 39
Discharge: HOME | End: 2024-06-26
Payer: COMMERCIAL

## 2024-06-26 DIAGNOSIS — E04.2 MULTIPLE THYROID NODULES: ICD-10-CM

## 2024-06-26 PROCEDURE — 76536 US EXAM OF HEAD AND NECK: CPT

## 2024-06-26 PROCEDURE — 76536 US EXAM OF HEAD AND NECK: CPT | Performed by: RADIOLOGY

## 2024-06-27 ENCOUNTER — HOSPITAL ENCOUNTER (OUTPATIENT)
Dept: RADIOLOGY | Facility: CLINIC | Age: 39
End: 2024-06-27
Payer: COMMERCIAL

## 2024-07-01 ENCOUNTER — HOSPITAL ENCOUNTER (OUTPATIENT)
Dept: RADIOLOGY | Facility: CLINIC | Age: 39
Discharge: HOME | End: 2024-07-01
Payer: COMMERCIAL

## 2024-07-01 VITALS — HEIGHT: 63 IN | BODY MASS INDEX: 23.79 KG/M2 | WEIGHT: 134.26 LBS

## 2024-07-01 DIAGNOSIS — Z30.9 ENCOUNTER FOR CONTRACEPTIVE MANAGEMENT, UNSPECIFIED TYPE: ICD-10-CM

## 2024-07-01 PROCEDURE — 77063 BREAST TOMOSYNTHESIS BI: CPT | Performed by: STUDENT IN AN ORGANIZED HEALTH CARE EDUCATION/TRAINING PROGRAM

## 2024-07-01 PROCEDURE — 77067 SCR MAMMO BI INCL CAD: CPT

## 2024-07-01 PROCEDURE — 77067 SCR MAMMO BI INCL CAD: CPT | Performed by: STUDENT IN AN ORGANIZED HEALTH CARE EDUCATION/TRAINING PROGRAM

## 2024-07-05 DIAGNOSIS — R92.30 DENSE BREAST TISSUE: Primary | ICD-10-CM

## 2024-07-08 ENCOUNTER — TELEPHONE (OUTPATIENT)
Dept: OBSTETRICS AND GYNECOLOGY | Facility: CLINIC | Age: 39
End: 2024-07-08

## 2024-07-08 NOTE — TELEPHONE ENCOUNTER
Patient called in needing a refill of their BC. Patient also stated that they normally get a yearly supply but lately has only gotten a 3 month supply and wanted to know if that could be changed.

## 2024-07-09 DIAGNOSIS — Z30.9 ENCOUNTER FOR CONTRACEPTIVE MANAGEMENT, UNSPECIFIED TYPE: ICD-10-CM

## 2024-07-09 RX ORDER — NORGESTREL AND ETHINYL ESTRADIOL 0.3-0.03MG
1 KIT ORAL DAILY
Qty: 336 TABLET | Refills: 0 | Status: SHIPPED | OUTPATIENT
Start: 2024-07-09

## 2024-07-19 ENCOUNTER — HOSPITAL ENCOUNTER (OUTPATIENT)
Dept: RADIOLOGY | Facility: CLINIC | Age: 39
Discharge: HOME | End: 2024-07-19
Payer: COMMERCIAL

## 2024-07-19 DIAGNOSIS — R92.30 DENSE BREAST TISSUE: ICD-10-CM

## 2024-07-19 PROCEDURE — 6100000003 BI MR BREAST BILATERAL WITH CONTRAST FAST SCREENING SELF PAY

## 2024-07-19 PROCEDURE — A9575 INJ GADOTERATE MEGLUMI 0.1ML: HCPCS

## 2024-07-19 PROCEDURE — 2550000001 HC RX 255 CONTRASTS

## 2024-07-19 RX ORDER — GADOTERATE MEGLUMINE 376.9 MG/ML
12 INJECTION INTRAVENOUS
Status: COMPLETED | OUTPATIENT
Start: 2024-07-19 | End: 2024-07-19

## 2024-08-05 ENCOUNTER — PATIENT MESSAGE (OUTPATIENT)
Dept: PRIMARY CARE | Facility: CLINIC | Age: 39
End: 2024-08-05
Payer: COMMERCIAL

## 2024-08-26 DIAGNOSIS — E04.1 THYROID NODULE: ICD-10-CM

## 2024-08-26 DIAGNOSIS — Z00.00 ANNUAL PHYSICAL EXAM: Primary | ICD-10-CM

## 2025-05-14 ENCOUNTER — TELEPHONE (OUTPATIENT)
Dept: OBSTETRICS AND GYNECOLOGY | Facility: CLINIC | Age: 40
End: 2025-05-14

## 2025-05-14 DIAGNOSIS — Z30.9 ENCOUNTER FOR CONTRACEPTIVE MANAGEMENT, UNSPECIFIED TYPE: ICD-10-CM

## 2025-05-14 RX ORDER — NORGESTREL AND ETHINYL ESTRADIOL 0.3-0.03MG
1 KIT ORAL DAILY
Qty: 84 TABLET | Refills: 0 | Status: SHIPPED | OUTPATIENT
Start: 2025-05-14

## 2025-05-14 NOTE — TELEPHONE ENCOUNTER
Patient is due for an annual but will need her birth control refilled soon. I have her scheduled on 06/25 but patient is requesting a refill before her appointment. Pharmacy is accurate on file, please advise.

## 2025-06-25 ENCOUNTER — APPOINTMENT (OUTPATIENT)
Dept: OBSTETRICS AND GYNECOLOGY | Facility: CLINIC | Age: 40
End: 2025-06-25
Payer: COMMERCIAL

## 2025-06-25 VITALS
DIASTOLIC BLOOD PRESSURE: 84 MMHG | HEIGHT: 63 IN | SYSTOLIC BLOOD PRESSURE: 122 MMHG | BODY MASS INDEX: 23.67 KG/M2 | WEIGHT: 133.6 LBS

## 2025-06-25 DIAGNOSIS — Z12.31 ENCOUNTER FOR SCREENING MAMMOGRAM FOR MALIGNANT NEOPLASM OF BREAST: ICD-10-CM

## 2025-06-25 DIAGNOSIS — Z30.9 ENCOUNTER FOR CONTRACEPTIVE MANAGEMENT, UNSPECIFIED TYPE: ICD-10-CM

## 2025-06-25 DIAGNOSIS — Z01.419 WELL WOMAN EXAM: Primary | ICD-10-CM

## 2025-06-25 DIAGNOSIS — E04.2 MULTIPLE THYROID NODULES: ICD-10-CM

## 2025-06-25 DIAGNOSIS — E06.3 HASHIMOTO'S THYROIDITIS: Primary | ICD-10-CM

## 2025-06-25 PROCEDURE — 1036F TOBACCO NON-USER: CPT

## 2025-06-25 PROCEDURE — 99396 PREV VISIT EST AGE 40-64: CPT

## 2025-06-25 PROCEDURE — 3008F BODY MASS INDEX DOCD: CPT

## 2025-06-25 RX ORDER — NORGESTREL AND ETHINYL ESTRADIOL 0.3-0.03MG
1 KIT ORAL DAILY
Qty: 84 TABLET | Refills: 3 | Status: SHIPPED | OUTPATIENT
Start: 2025-06-25

## 2025-06-25 ASSESSMENT — PATIENT HEALTH QUESTIONNAIRE - PHQ9
SUM OF ALL RESPONSES TO PHQ9 QUESTIONS 1 AND 2: 0
2. FEELING DOWN, DEPRESSED OR HOPELESS: NOT AT ALL
1. LITTLE INTEREST OR PLEASURE IN DOING THINGS: NOT AT ALL

## 2025-06-25 NOTE — PROGRESS NOTES
"Subjective   Keisha Whatley is a 40 y.o. female who is here for a routine exam. No vaginal concerns at this time including abnormal discharge, odor or discomfort. She is sexually active with one partner and has no concern for STI exposure. She has no pain or bleeding with sex. She denies bladder or bowel concerns.    Current contraception: OCP (estrogen/progesterone)  LMP: 6/3/25  Menses: regular 28-30 days, lasting 4 days.   Last pap:  nml hpv-  History of abnormal Pap smear: no  Due for pap: no  Family history of uterine or ovarian cancer: no  Family history of prostate cancer: no  Family history of pancreatic cancer: no  Family hx of BRCA1/BRCA2: no  Family history of breast cancer: no  Last mammogram:   [facility]: never received      OB History          0    Para   0    Term   0       0    AB   0    Living   0         SAB   0    IAB   0    Ectopic   0    Multiple   0    Live Births   0                 Review of Systems    Objective   /84 (BP Location: Right arm, Patient Position: Sitting, BP Cuff Size: Adult)   Ht 1.6 m (5' 3\")   Wt 60.6 kg (133 lb 9.6 oz)   LMP 2025   BMI 23.67 kg/m²     Physical Exam  Constitutional:       Appearance: Normal appearance.   Neck:      Thyroid: Thyroid mass present. No thyromegaly or thyroid tenderness.      Comments: Nodule noted on palpation of thyroid   Pulmonary:      Effort: Pulmonary effort is normal.   Chest:   Breasts:     Breasts are symmetrical.      Right: Normal.      Left: Normal.   Abdominal:      Palpations: Abdomen is soft.   Genitourinary:     General: Normal vulva.      Uterus: Normal.       Adnexa: Right adnexa normal and left adnexa normal.   Musculoskeletal:      Cervical back: Normal range of motion.   Skin:     General: Skin is warm.   Neurological:      General: No focal deficit present.      Mental Status: She is alert.   Psychiatric:         Mood and Affect: Mood normal.          Assessment/Plan   A&P --> 40 y.o. " y.o woman for annual GYN exam.     - Health Maintenance -->  - Routine follow up with PCP for health maintenance examination encouraged, including TSH and thyroid nodules, cholesterol, and Vit. D evaluation.   Self breast awareness encouraged; concerning characteristics of breasts reviewed - Pt. will report general concerns, any adherent lumps, skin dimpling/puckering or color changes, and any nipple discharge.    Diet and exercise reviewed.     Pap:- Reviewed ACOG and ASCCP guidelines with pt. Next due, 2028       - STD Screening: declined     - Contraception Plan: OCP (estrogen/progesterone) No new contraindications noted for use and refills sent to pharmacy. Discussed with patient increasing age and increase risk for VTE. Recommended to patient stopping estrogen use at age 43. Patient reports SO plans on vasectomy.      - F/U 1 year or as needed.    Alem MCCARTHY  I saw and evaluated the patient. I personally obtained the key and critical portions of the history and physical exam or was physically present for key and critical portions performed by the student. I reviewed the student's documentation and discussed the patient with the student. I agree with the student's medical decision making as documented in the note.     FIONA Celeste-MICHAEL

## 2025-07-24 ENCOUNTER — APPOINTMENT (OUTPATIENT)
Dept: RADIOLOGY | Facility: CLINIC | Age: 40
End: 2025-07-24
Payer: COMMERCIAL

## 2025-07-24 VITALS — WEIGHT: 133 LBS | HEIGHT: 63 IN | BODY MASS INDEX: 23.57 KG/M2

## 2025-07-24 DIAGNOSIS — Z12.31 ENCOUNTER FOR SCREENING MAMMOGRAM FOR MALIGNANT NEOPLASM OF BREAST: ICD-10-CM

## 2025-07-24 PROCEDURE — 77067 SCR MAMMO BI INCL CAD: CPT | Performed by: RADIOLOGY

## 2025-07-24 PROCEDURE — 77067 SCR MAMMO BI INCL CAD: CPT

## 2025-07-24 PROCEDURE — 77063 BREAST TOMOSYNTHESIS BI: CPT | Performed by: RADIOLOGY

## 2025-07-25 ENCOUNTER — APPOINTMENT (OUTPATIENT)
Dept: RADIOLOGY | Facility: CLINIC | Age: 40
End: 2025-07-25
Payer: COMMERCIAL

## 2025-08-05 DIAGNOSIS — Z30.9 ENCOUNTER FOR CONTRACEPTIVE MANAGEMENT, UNSPECIFIED TYPE: ICD-10-CM

## 2025-08-05 RX ORDER — NORGESTREL AND ETHINYL ESTRADIOL 0.3-0.03MG
1 KIT ORAL DAILY
Qty: 84 TABLET | Refills: 3 | Status: SHIPPED | OUTPATIENT
Start: 2025-08-05

## 2025-08-13 ENCOUNTER — APPOINTMENT (OUTPATIENT)
Dept: RADIOLOGY | Facility: CLINIC | Age: 40
End: 2025-08-13
Payer: COMMERCIAL

## 2025-08-22 ENCOUNTER — APPOINTMENT (OUTPATIENT)
Dept: RADIOLOGY | Facility: CLINIC | Age: 40
End: 2025-08-22
Payer: COMMERCIAL

## 2025-09-02 ENCOUNTER — APPOINTMENT (OUTPATIENT)
Dept: RADIOLOGY | Facility: CLINIC | Age: 40
End: 2025-09-02
Payer: COMMERCIAL

## 2025-09-03 ENCOUNTER — APPOINTMENT (OUTPATIENT)
Dept: RADIOLOGY | Facility: CLINIC | Age: 40
End: 2025-09-03
Payer: COMMERCIAL